# Patient Record
Sex: FEMALE
[De-identification: names, ages, dates, MRNs, and addresses within clinical notes are randomized per-mention and may not be internally consistent; named-entity substitution may affect disease eponyms.]

---

## 2021-10-18 ENCOUNTER — HOSPITAL ENCOUNTER (EMERGENCY)
Dept: HOSPITAL 56 - MW.ED | Age: 36
Discharge: HOME | End: 2021-10-18
Payer: SELF-PAY

## 2021-10-18 DIAGNOSIS — R06.02: ICD-10-CM

## 2021-10-18 DIAGNOSIS — R07.9: ICD-10-CM

## 2021-10-18 DIAGNOSIS — Z20.822: ICD-10-CM

## 2021-10-18 DIAGNOSIS — R42: Primary | ICD-10-CM

## 2021-10-18 LAB
BUN SERPL-MCNC: 10 MG/DL (ref 7–18)
CHLORIDE SERPL-SCNC: 101 MMOL/L (ref 98–107)
CO2 SERPL-SCNC: 25.6 MMOL/L (ref 21–32)
GLUCOSE SERPL-MCNC: 110 MG/DL (ref 74–106)
POTASSIUM SERPL-SCNC: 4.1 MMOL/L (ref 3.5–5.1)
SODIUM SERPL-SCNC: 139 MMOL/L (ref 136–145)

## 2021-10-18 PROCEDURE — U0002 COVID-19 LAB TEST NON-CDC: HCPCS

## 2021-10-18 NOTE — CR
INDICATION:



Chest pain and SOB.



TECHNIQUE:



Upright portable AP image of the chest.



COMPARISON:



None.



FINDINGS:



Lordotic projection. Lungs clear. No pleural effusion or pneumothorax. 

Heart size and pulmonary vasculature within normal limits. No obvious rib 

fracture or other significant osseous abnormality.



IMPRESSION:



Negative chest.



Dictated by Isidro Gipson MD @ 10/18/2021 11:34:03 AM



(Electronically Signed)

## 2024-04-23 NOTE — EDM.PDOC
ED HPI GENERAL MEDICAL PROBLEM





- General


Stated Complaint: DIZZY/SOB/CHEST PAIN THIS MORNING


Time Seen by Provider: 10/18/21 09:46


Source of Information: Reports: Patient


History Limitations: Reports: No Limitations





- History of Present Illness


INITIAL COMMENTS - FREE TEXT/NARRATIVE: 





36-year-old female presents for chest pain, shortness of breath, dizziness.  

Patient notes over the last 2 months she has been struggling with a "sinus 

infection".  She is gone to the walk-in clinic multiple times and finished a 

course of amoxicillin and most recently a course of Levaquin.  She notes that 

she was never Covid tested during this time although she did lose her sense of 

taste and smell.  She notes that her "sinus infection" has improved and she is 

no longer feeling congested, however, she is still having some shortness of 

breath worse with exertion as well as intermittent sharp chest pains.  She 

describes a sharp chest pains lasting for 2 to 3 seconds and occurring near her 

left or right collarbone.  This occurs a few times throughout the day.  She does

have a family history of cardiac disease.  She has never had a stress test or 

cardiac evaluation.


  ** chest


Pain Score (Numeric/FACES): 7





- Related Data


                                    Allergies











Allergy/AdvReac Type Severity Reaction Status Date / Time


 


No Known Allergies Allergy   Verified 10/18/21 09:54











Home Meds: 


                                    Home Meds





Fluticasone Propionate [Flonase Allergy Relief] 2 spray TITUS DAILY 10/18/21 

[History]











ED ROS GENERAL





- Review of Systems


Review Of Systems: Comprehensive ROS is negative, except as noted in HPI.





ED EXAM, GENERAL





- Physical Exam


Exam: See Below


Exam Limited By: No Limitations


General Appearance: Alert, WD/WN, No Apparent Distress


Ears: Hearing Grossly Normal


Throat/Mouth: Normal Voice, No Airway Compromise


Head: Atraumatic, Normocephalic


Respiratory/Chest: No Respiratory Distress, Lungs Clear, Normal Breath Sounds, 

No Accessory Muscle Use


Cardiovascular: Normal Peripheral Pulses, Regular Rate, Rhythm


Extremities: Normal Inspection


Neurological: Alert, Normal Cognition, Normal Gait


Psychiatric: Normal Affect, Normal Mood


Skin Exam: Warm, Dry, Intact, Normal Color


  ** #1 Interpretation


EKG Date: 10/18/21


Time: 09:52


Rhythm: NSR


Rate (Beats/Min): 89


Axis: Normal


P-Wave: Present


QRS: Normal


ST-T: Normal


QT: Normal


ME/PQ Interval: 130


Comparison: NA - No Prior EKG


EKG Interpretation Comments: 





normal EKG





Course





- Vital Signs


Last Recorded V/S: 


                                Last Vital Signs











Temp  97.8 F   10/18/21 09:51


 


Pulse  105 H  10/18/21 09:51


 


Resp  20   10/18/21 09:51


 


BP  152/99 H  10/18/21 10:00


 


Pulse Ox  99   10/18/21 09:51














- Orders/Labs/Meds


Labs: 


                                Laboratory Tests











  10/18/21 10/18/21 10/18/21 Range/Units





  10:21 10:26 10:26 


 


WBC   6.49   (4.0-11.0)  K/uL


 


RBC   4.14 L   (4.30-5.90)  M/uL


 


Hgb   13.3   (12.0-16.0)  g/dL


 


Hct   39.7   (36.0-46.0)  %


 


MCV   95.9   (80.0-98.0)  fL


 


MCH   32.1 H   (27.0-32.0)  pg


 


MCHC   33.5   (31.0-37.0)  g/dL


 


RDW Std Deviation   47.2   (28.0-62.0)  fl


 


RDW Coeff of Arline   14   (11.0-15.0)  %


 


Plt Count   251   (150-400)  K/uL


 


MPV   10.20   (7.40-12.00)  fL


 


Neut % (Auto)   47.6 L   (48.0-80.0)  %


 


Lymph % (Auto)   38.1   (16.0-40.0)  %


 


Mono % (Auto)   8.3   (0.0-15.0)  %


 


Eos % (Auto)   5.7   (0.0-7.0)  %


 


Baso % (Auto)   0.3   (0.0-1.5)  %


 


Neut # (Auto)   3.1   (1.4-5.7)  K/uL


 


Lymph # (Auto)   2.5 H   (0.6-2.4)  K/uL


 


Mono # (Auto)   0.5   (0.0-0.8)  K/uL


 


Eos # (Auto)   0.4   (0.0-0.7)  K/uL


 


Baso # (Auto)   0.0   (0.0-0.1)  K/uL


 


Nucleated RBC %   0.0   /100WBC


 


Nucleated RBCs #   0   K/uL


 


Sodium    139  (136-145)  mmol/L


 


Potassium    4.1  (3.5-5.1)  mmol/L


 


Chloride    101  ()  mmol/L


 


Carbon Dioxide    25.6  (21.0-32.0)  mmol/L


 


BUN    10  (7.0-18.0)  mg/dL


 


Creatinine    0.7  (0.6-1.0)  mg/dL


 


Est Cr Clr Drug Dosing    80.57  mL/min


 


Estimated GFR (MDRD)    > 60.0  ml/min


 


Glucose    110 H  ()  mg/dL


 


Calcium    8.7  (8.5-10.1)  mg/dL


 


Total Bilirubin    0.4  (0.2-1.0)  mg/dL


 


AST    35  (15-37)  IU/L


 


ALT    37  (14-63)  IU/L


 


Alkaline Phosphatase    90  ()  U/L


 


Troponin I    < 0.050  (0.000-0.056)  ng/mL


 


Total Protein    7.7  (6.4-8.2)  g/dL


 


Albumin    3.8  (3.4-5.0)  g/dL


 


Globulin    3.9  (2.6-4.0)  g/dL


 


Albumin/Globulin Ratio    1.0  (0.9-1.6)  


 


TSH, Ultra Sensitive    1.75  (0.36-3.74)  uIU/mL


 


SARS-CoV-2 RNA (BRITNEY)  NEGATIVE    (NEGATIVE)  











Meds: 


Medications














Discontinued Medications














Generic Name Dose Route Start Last Admin





  Trade Name Freq  PRN Reason Stop Dose Admin


 


Sodium Chloride  1,000 mls @ 999 mls/hr  10/18/21 10:12  10/18/21 10:33





  Normal Saline  IV  10/18/21 11:12  Not Given





  .Bolus ONE  














- Re-Assessments/Exams


Free Text/Narrative Re-Assessment/Exam: 





10/18/21 10:11


EKG is nonischemic.  Will get labs for cardiac work-up.  Will get chest x-ray.  

Will get Covid swab.


10/18/21 10:16


Patient declines IV.


10/18/21 11:43


Labs and imaging are unremarkable.  Will discharge with a short course of 

meclizine and recommend ENT follow-up for vertiginous symptoms.  Also give 

cardiology follow-up for episodic chest pain although I have a very low 

suspicion of ACS or cardiac related chest pain given the reassuring history and 

benign physical exam.





Departure





- Departure


Time of Disposition: 11:44


Disposition: Home, Self-Care 01


Condition: Good


Clinical Impression: 


 Vertigo








- Discharge Information


Instructions:  Vertigo, Nonspecific Chest Pain, Adult, Easy-to-Read


Referrals: 


PCP,None [Primary Care Provider] - 


Additional Instructions: 


ENT follow-up:


Dr. Timothy Zaldivar


[facility] [[email or web address]] Clinic, [address]


[address] [place], [place] [id number] [phone]


Dr. Ciro Samuels


[facility]  ENT


[address] [place] [address]


[phone]





[facility]  Cardiology


[address] [place] [address] [phone]





The following information is given to patients seen in the emergency department 

who are being discharged to home. This information is to outline your options 

for follow-up care. We provide all patients seen in our emergency department 

with a follow-up referral.





The need for follow-up, as well as the timing and circumstances, are variable 

depending upon the specifics of your emergency department visit.





If you don't have a primary care physician on staff, we will provide you with a 

referral. We always advise you to contact your personal physician following an 

emergency department visit to inform them of the circumstance of the visit and 

for follow-up with them and/or the need for any referrals to a consulting 

specialist.





The emergency department will also refer you to a specialist when appropriate. 

This referral assures that you have the opportunity for follow-up care with a 

specialist. All of these measure are taken in an effort to provide you with 

optimal care, which includes your follow-up.





Under all circumstances we always encourage you to contact your private 

physician who remains a resource for coordinating your care. When calling for 

follow-up care, please make the office aware that this follow-up is from your 

recent emergency room visit. If for any reason you are refused follow-up, please

contact the [facility] Emergency Department

at [phone] and asked to speak to the emergency department charge nurse.





Please follow up with your primary care physician. If you do not have a primary 

care physician, see below:


[facility] Primary Care


[address] [place] [id number] [phone]





[facility]


[address] [place] [address] [phone]








[facility] - Pediatric Clinic


[address] [place] [address]


Phone: [phone]


Fax: [phone]








Sepsis Event Note (ED)





- Evaluation


Sepsis Screening Result: No Definite Risk





- Focused Exam


Vital Signs: 


                                   Vital Signs











  Temp Pulse Resp BP Pulse Ox


 


 10/18/21 10:00     152/99 H 


 


 10/18/21 09:51  97.8 F  105 H  20  173/107 H  99 Refer to the Assessment tab to view/cancel completed assessment.